# Patient Record
Sex: FEMALE | Race: WHITE
[De-identification: names, ages, dates, MRNs, and addresses within clinical notes are randomized per-mention and may not be internally consistent; named-entity substitution may affect disease eponyms.]

---

## 2020-11-06 ENCOUNTER — HOSPITAL ENCOUNTER (OUTPATIENT)
Dept: HOSPITAL 47 - RADXRMAIN | Age: 55
Discharge: HOME | End: 2020-11-06
Attending: NURSE PRACTITIONER
Payer: COMMERCIAL

## 2020-11-06 DIAGNOSIS — M16.0: ICD-10-CM

## 2020-11-06 DIAGNOSIS — M51.16: Primary | ICD-10-CM

## 2020-11-06 PROCEDURE — 73521 X-RAY EXAM HIPS BI 2 VIEWS: CPT

## 2020-11-06 PROCEDURE — 72100 X-RAY EXAM L-S SPINE 2/3 VWS: CPT

## 2020-11-06 NOTE — XR
Lumbar spine

 

HISTORY: Radiculopathy, pain in hip, M 54.16

 

3 views of the lumbar spine

 

Bone mineralization, alignment, vertebral body height are maintained. Mild loss of the disc height at
 L5-S1. There is mild multilevel spondylosis. Sclerosis is present in the posterior elements. Surgica
l clips present in the pelvis. Suspect there are vascular calcifications present.

 

IMPRESSION: Degenerative disc disease and facet arthropathy. Additional findings above.

## 2020-11-06 NOTE — XR
Bilateral hips

 

HISTORY: Pain

 

2 views of each hip submitted

 

There is an insertional enthesophyte bilaterally the greater trochanters. Marginal spurring present a
t the femoral heads. Bone mineralization and alignment are maintained. No fracture or dislocation. Khalil
rgical clips present in the pelvis.

 

IMPRESSION: Mild osteoarthritis.

## 2022-08-02 ENCOUNTER — HOSPITAL ENCOUNTER (OUTPATIENT)
Dept: HOSPITAL 47 - RADUSWWP | Age: 57
Discharge: HOME | End: 2022-08-02
Attending: FAMILY MEDICINE
Payer: COMMERCIAL

## 2022-08-02 DIAGNOSIS — I73.9: Primary | ICD-10-CM

## 2022-08-02 PROCEDURE — 93923 UPR/LXTR ART STDY 3+ LVLS: CPT

## 2022-08-03 NOTE — US
EXAMINATION TYPE: US arterial LE multi level

 

DATE OF EXAM: 8/2/2022 11:13 AM

 

CLINICAL HISTORY: I73.9 VASCULAR DISEASE R06.02 SOB R07.9 CHEST PAIN. History of diabetes, tobacco us
e, hyperlipidemia, and resting pain.

 

Doppler Waveforms: 

Right: Monophasic multiphasic

Left: Monophasic to biphasic

 

Pressure Gradients: Flattened

 

Ankle-Brachial Indices:

Right: 1.03

Left: 0.72

 

 

 

IMPRESSION:  Normal CEM values. Loss of phasicity is present, this however may be technical in etiolo
gy. Correlate clinically.

## 2022-09-23 ENCOUNTER — HOSPITAL ENCOUNTER (OUTPATIENT)
Dept: HOSPITAL 47 - RADCTMAIN | Age: 57
Discharge: HOME | End: 2022-09-23
Attending: FAMILY MEDICINE
Payer: COMMERCIAL

## 2022-09-23 DIAGNOSIS — Z12.2: Primary | ICD-10-CM

## 2022-09-23 DIAGNOSIS — Z87.891: ICD-10-CM

## 2022-09-23 PROCEDURE — 71271 CT THORAX LUNG CANCER SCR C-: CPT

## 2022-09-23 NOTE — CTL
EXAMINATION TYPE:  CT Low Dose Lung

 

DATE OF EXAM ORDERED: 9/23/2022

 

COMPARISON: none

 

HISTORY: . Low Dose CT Lung Screening

 

CT DLP: 95.9 mGycm

CT CTDI: 2.7 mGy

 

IV CONTRAST USED: None.

SCREENING VISIT: First visit

COMPARISON: None.

 

TECHNIQUE: Low dose computed tomography scan was performed through the chest at 1 millimeter thick se
ctions and reconstructed images in the coronal plane at 1 mm thick sections.

 

CT DIAGNOSTIC QUALITY:  Satisfactory

 

FINDINGS:

 

LUNG NODULES:  Not presentLeft lung: no nodules identified.Right lung: no nodules identified. Calcifi
ed granuloma right lower lobe.

 

LUNGS:

COPD: Severity: None  

Fibrosis: Severity:None   

Lymph nodes: None  

Other findings: None  

 

RIGHT PLEURAL SPACE: 

Effusion: None  

Calcification: None   

Thickening: None  

Pneumothorax: None  

 

LEFT PLEURAL SPACE: 

Effusion: None  

Calcification: None  

Thickening: None   

Pneumothorax: None  

 

HEART:  

Heart Size: Mildly enlarged

Coronary calcification: Mild 

Pericardial effusion: None   

 

OTHER FINDINGS:

Upper abdomen: No significant abnormality

Bony thorax: Degenerative changes

Supraclavicular region: No significant abnormalityOther: No significant abnormalityI

 

IMPRESSION: Evidence of remote granulomatous disease.

 

FOLLOW UP CT CHEST 

 

RECOMMENDATION: Follow-up screening in one year

 

CT LUNG RAD: 

 

LUNG RAD CATEGORY 2 benign

## 2022-09-30 ENCOUNTER — HOSPITAL ENCOUNTER (OUTPATIENT)
Dept: HOSPITAL 47 - RADNMMAIN | Age: 57
End: 2022-09-30
Attending: FAMILY MEDICINE
Payer: COMMERCIAL

## 2022-09-30 DIAGNOSIS — R06.02: Primary | ICD-10-CM

## 2022-09-30 DIAGNOSIS — I73.9: ICD-10-CM

## 2022-09-30 DIAGNOSIS — R07.9: ICD-10-CM

## 2022-09-30 PROCEDURE — 93351 STRESS TTE COMPLETE: CPT

## 2022-09-30 NOTE — CA
Stress Echo 

 Report 

 

 Vivi Verdin 

 

 Age:    57     Gender:    F 

 

 :    1965 

 

 Exam Date:     2022 09:36 

 

 Exam Location: [facility] 

 

 Ht (in):     62     Wt (lb):    160 

 

 Ordering Physician:        Payton Welsh MD 

 

 Referring Physician:       SHA,, 

 

 Technician:                Tayla Moreno RDCS 

 Technologist 

 MRN:    [id number] 

 

 Procedure CPT: 

 

 Indication:        I73.9 VASCULAR DISEASE R06.02 SOB R07.9 CHEST  

                    PAIN 

 

 ICD-9 Codes: 

 

 Rhythm: 

 

 Patient History: 

 

 Cardiac Medications: 

 

 Medications in past 24 hours: 

 

 Contrast:     Lumason 

 

 Stress Results 

 

 Protocol:     Trevor 

 

 Total dose(mL):       5 

 

 Exercise Duration (min:sec): 

 Max ST Depression (mm): 

 Angina Score: 

 Shelley Score: 

 METS:    4.6 

 

 Resting HR:      87        Resting BP:     116    /   64 

 

 Peak HR:     121       Peak BP:     135   /   75 

 

 Max Predicted HR:       161 

 

 75     % Max Predicted HR 

 

 Target HR:     137 

 

 Double Product:       13090 

 

 Stress Summary: 

 

 BP Response: 

 

 Reason for Termination: 

 

 Cardiac Symptoms: 

 

 ECG Analysis 

 

 Resting ECG: 

 

 Stress ECG: 

 

 Arrhythmia: 

 Echo Analysis 

 

 Resting Echo: 

 

 Peak Echo Analysis: 

 

 MEASUREMENTS (Male/Female) Normal Values 

 

 

 

 

 CONCLUSIONS 

 Average exercise tolerance 

 Normal EKG in response to exercise 

 Normal echocardiogram in response to exercise 

 

 Dr. Jaren Dawn MD 

 (Electronically Signed) 

 Final Date:      2022 18:08

## 2022-11-07 ENCOUNTER — HOSPITAL ENCOUNTER (OUTPATIENT)
Dept: HOSPITAL 47 - RADECHMAIN | Age: 57
Discharge: HOME | End: 2022-11-07
Attending: FAMILY MEDICINE
Payer: COMMERCIAL

## 2022-11-07 DIAGNOSIS — I34.81: Primary | ICD-10-CM

## 2022-11-07 PROCEDURE — 93306 TTE W/DOPPLER COMPLETE: CPT

## 2022-11-08 NOTE — CA
Transthoracic Echo Report 

 Name: Vivi Verdin 

 MRN:    [id number] 

 Age:    57     Gender:     F 

 

 :    1965 

 Exam Date:     2022 13:20 

 Exam Location: [facility] 

 Ht (in):     62     Wt (lb):     150 

 Ordering Physician:        Payton Welsh MD 

 Attending/Referring Phys:         Payton Welsh MD 

 Technician         Perla Hua RDCS 

 Procedure CPT: 

 Indications:       I73.9 VASCULAR DISEASE R06.02 SOB R07.9 CHEST PAIN 

 

 Cardiac Hx: 

 Technical Quality:      Fair 

 Contrast 1:                                Total Dose (mL): 

 Contrast 2:                                Total Dose (mL): 

 

 MEASUREMENTS  (Male / Female) Normal Values 

 2D ECHO 

 LV Diastolic Diameter PLAX        3.5 cm                4.2 - 5.9 / 3.9 - 5.3 cm 

 LV Systolic Diameter PLAX         2.5 cm                 

 IVS Diastolic Thickness           0.9 cm                0.6 - 1.0 / 0.6 - 0.9 cm 

 LVPW Diastolic Thickness          1.0 cm                0.6 - 1.0 / 0.6 - 0.9 cm 

 LV Relative Wall Thickness        0.5                    

 RV Internal Dim ED PLAX           2.3 cm                 

 LA Volume                         28.4 cm???              18 - 58 / 22 - 52 cm??? 

 

 M-MODE 

 Aortic Root Diameter MM           3.2 cm                 

 LA Systolic Diameter MM           3.1 cm                 

 LA Ao Ratio MM                    1.0                    

 AV Cusp Separation MM             1.9 cm                 

 

 DOPPLER 

 AV Peak Velocity                  125.0 cm/s             

 AV Peak Gradient                  6.2 mmHg               

 AV Mean Velocity                  71.8 cm/s              

 AV Mean Gradient                  2.4 mmHg               

 AV Velocity Time Integral         19.2 cm                

 LVOT Peak Velocity                123.9 cm/s             

 LVOT Peak Gradient                6.1 mmHg               

 LVOT Velocity Time Integral       25.9 cm                

 MV Area PHT                       4.3 cm???                

 Mitral E Point Velocity           56.7 cm/s              

 Mitral A Point Velocity           97.1 cm/s              

 Mitral E to A Ratio               0.6                    

 MV Deceleration Time              175.6 ms               

 TR Peak Velocity                  195.7 cm/s             

 TR Peak Gradient                  15.3 mmHg              

 Right Ventricular Systolic Press  20.3 mmHg              

 

 

 FINDINGS 

 Left Ventricle 

 Normal Left ventricular size, wall thickness, systolic function with no obvious  

 regional wall motion abnormalities. Normal Left ventricular diastolic filling  

 pattern. Left ventricular ejection fraction is estimated at 55-60 %. 

 

 Right Ventricle 

 Normal right ventricular size and function. Right ventricular systolic pressure  

 within normal limits. 

 

 Right Atrium 

 Normal right atrial size. 

 

 Left Atrium 

 Normal left atrial size. 

 

 Mitral Valve 

 Structurally normal mitral valve. Mitral valve thickened. Mild mitral annular  

 calcification. Mild mitral regurgitation. 

 

 Aortic Valve 

 No aortic valve stenosis or regurgitation. 

 

 Tricuspid Valve 

 Structurally normal tricuspid valve. Mild tricuspid regurgitation. 

 

 Pulmonic Valve 

 Trace pulmonic regurgitation. 

 

 Pericardium 

 No pericardial effusion. Echo free space anterior to the right ventricle likely  

 represents a fat pad. 

 

 Aorta 

 Normal size aortic root and proximal ascending aorta. 

 

 CONCLUSIONS 

 Normal biventricular dimension and systolic function 

 Mitral annular calcifications 

 Previewed by:  

 Dr. Jaren Dawn MD 

 (Electronically Signed) 

 Final Date:      2022 09:37
[Time Spent: ___ minutes] : I have spent [unfilled] minutes of time on the encounter.

## 2023-08-03 ENCOUNTER — HOSPITAL ENCOUNTER (OUTPATIENT)
Dept: HOSPITAL 47 - RADUSWWP | Age: 58
Discharge: HOME | End: 2023-08-03
Attending: FAMILY MEDICINE
Payer: COMMERCIAL

## 2023-08-03 DIAGNOSIS — R00.2: ICD-10-CM

## 2023-08-03 DIAGNOSIS — F17.210: ICD-10-CM

## 2023-08-03 DIAGNOSIS — E78.5: ICD-10-CM

## 2023-08-03 DIAGNOSIS — I73.9: Primary | ICD-10-CM

## 2023-08-03 PROCEDURE — 93923 UPR/LXTR ART STDY 3+ LVLS: CPT

## 2023-08-03 NOTE — US
EXAMINATION TYPE: US arterial LE multi level

 

DATE OF EXAM: 8/3/2023 9:43 AM

 

COMPARISON: 8/2/2022

 

CLINICAL INDICATION: Female, 58 years old with history of I73.9 PERIPHERAL VASCULAR DISEASE; PVD

 

History of:

Smoker: Yes 

Hypertension:  No

Diabetic:  No

Hyperlipidemia:  Yes

TIA/CVA:  No

Previous Vascular Surgery:  No

CAD:  No

MI:  No

Vascular Ulcers:  No

Claudication:  No

Gangrene:  No

 

Doppler Waveforms: 

Right: Multiphasic

Left: Multiphasic

 

Ankle-Brachial Indices:

Right: 0.97

Left: 0.68

 

Toe Brachial Indices:

Right: 0.83

Left: 0.69

 

 

 

IMPRESSION:  

1. Abnormal left CEM suggestive of mild to moderate atherosclerotic peripheral vascular occlusive dis
ease.

2. Normal right CEM. patient

## 2024-08-22 ENCOUNTER — HOSPITAL ENCOUNTER (OUTPATIENT)
Dept: HOSPITAL 47 - RADMAMWWP | Age: 59
Discharge: HOME | End: 2024-08-22
Attending: FAMILY MEDICINE
Payer: COMMERCIAL

## 2024-08-22 ENCOUNTER — HOSPITAL ENCOUNTER (OUTPATIENT)
Dept: HOSPITAL 47 - RADMAMWWP | Age: 59
End: 2024-08-22
Attending: FAMILY MEDICINE
Payer: COMMERCIAL

## 2024-08-22 DIAGNOSIS — N60.21: ICD-10-CM

## 2024-08-22 DIAGNOSIS — N63.10: Primary | ICD-10-CM

## 2024-08-22 DIAGNOSIS — R92.8: Primary | ICD-10-CM

## 2024-08-22 PROCEDURE — 19083 BX BREAST 1ST LESION US IMAG: CPT

## 2024-08-22 PROCEDURE — 88341 IMHCHEM/IMCYTCHM EA ADD ANTB: CPT

## 2024-08-22 PROCEDURE — 88305 TISSUE EXAM BY PATHOLOGIST: CPT

## 2024-08-22 PROCEDURE — 77065 DX MAMMO INCL CAD UNI: CPT

## 2024-08-22 PROCEDURE — 76642 ULTRASOUND BREAST LIMITED: CPT

## 2024-08-22 PROCEDURE — 88342 IMHCHEM/IMCYTCHM 1ST ANTB: CPT

## 2024-09-11 NOTE — MM
Reason for Exam: Post Procedure Mammogram. 





Risk Values: 

Ros 5 year model risk: 1.0%.

NCI Lifetime model risk: 4.7%.





Tissue Density: 

Right: The breasts are heterogeneously dense, which may obscure small masses.

Pathology Description: 

Location: 10 o'clock.

Marker Left Behind.

Needle Type: Celero         Cores: 6 Gauge: 12

The procedure of ultrasound guided core biopsy was explained to the patient. Benefits, alternatives,

and risks were discussed. An informed consent was then obtained.



The patient was placed in supine positioning for imaging and for the procedure. The overlying skin

was prepped and draped in usual sterile fashion. Lidocaine buffered with bicarbonate was used as

anesthetic into the skin and subcutaneous tissue up to area of concern in the right 10:00 breast. A

nick was made with surgical scalpel.



Under ultrasound guidance, a 12-gauge vacuum assisted biopsy gun device was used to obtain 6 core

samples. Following this, a biopsy clip was left in lesion.



The patient tolerated the procedure well without any immediate complication. The patient was kept in

the radiology department for short stay after the procedure and then discharged home in stable

condition.



Postprocedure mammogram: The patient was transferred to mammography for physician ordered post

procedure mammogram for clip placement verification.







Impression: Successful, uncomplicated ultrasound guided core biopsy of area of concern in the right

10:00 breast, full pathology results to follow. 





Pathology Results: 

Result: Benign, Fibrocystic change.

Pathology and radiology were reviewed. Findings are concordant.



RIGHT BREAST, TEN O'CLOCK, 5 CM FROM NIPPLE, ULTRASOUND GUIDED BIOPSY:  Fibrocystic change with

sclerosing adenosis and microcalcifications (see note).



Notes

P63 on A1 and SMMHC on A2 are both positive for myoepithelial cells, confirming absence of invasive

carcinoma. 





Overall Assessment: Benign





Assessment: MG diagnostic mammo RT wo CAD - Right: Benign, BI-RAD 2. 





Management: 

Diagnostic Breast Ultrasound of the right breast in 6 months.

Electronically signed and approved by: Leopold M. Fregoli, M.D. Radiologis

## 2024-09-12 NOTE — USB
Reason for Exam: Additional evaluation requested from abnormal screening. 





Patient History: 

08/22/2024, Benign US biopsy breast VAD RT on the right side. 





Risk Values: 

Ros 5 year model risk: 1.2%.

NCI Lifetime model risk: 5.6%.

Technique: 

Method: Targeted.  Doppler: Color.  Patient Position: Supine.  





Prior Study Comparison: 

8/22/2024 Right MG diagnostic mammo RT wo CAD, [facility]. 





Findings: 

The upper outer quadrant of the right breast was scanned.

Nondisplaced right hyperechoic area right breast 10:00 position 5 cm from the nipple. Tissue

diagnosis is recommended. 





Overall Assessment: Suspicious, BI-RAD 4





Management: 

Ultrasound Core Biopsy of the right breast.

A clinical breast exam by your physician is recommended on an annual basis and results should be

correlated with mammographic findings.  This exam should not preclude additional follow-up of

suspicious palpable abnormalities.  Results were given to the patient verbally at the time of exam.



Electronically signed and approved by: Leopold M. Fregoli, M.D. Radiologis